# Patient Record
Sex: MALE | Race: WHITE
[De-identification: names, ages, dates, MRNs, and addresses within clinical notes are randomized per-mention and may not be internally consistent; named-entity substitution may affect disease eponyms.]

---

## 2020-04-20 ENCOUNTER — HOSPITAL ENCOUNTER (EMERGENCY)
Dept: HOSPITAL 26 - MED | Age: 30
Discharge: ANOTHER HOSPITAL | End: 2020-04-20
Payer: MEDICAID

## 2020-04-20 VITALS — BODY MASS INDEX: 19.7 KG/M2 | HEIGHT: 68 IN | WEIGHT: 130 LBS

## 2020-04-20 VITALS — DIASTOLIC BLOOD PRESSURE: 67 MMHG | SYSTOLIC BLOOD PRESSURE: 123 MMHG

## 2020-04-20 VITALS — DIASTOLIC BLOOD PRESSURE: 71 MMHG | SYSTOLIC BLOOD PRESSURE: 117 MMHG

## 2020-04-20 DIAGNOSIS — Z02.89: ICD-10-CM

## 2020-04-20 DIAGNOSIS — M54.5: Primary | ICD-10-CM
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
## 2020-04-20 NOTE — NUR
Patient to be transferred to [facility].  Is being transferred due 
to HIGHER LEVEL OF CARE.  Receiving facility has accepting physician and 
available space. ER physician has signed transfer form.  Patient or responsible 
party has agreed to transfer and signed form.  Patient belongings inventoried 
and will be sent with patient.  Copy of nursing notes, lab reports, EKG, 
Physicians Orders and X-rays to be sent with patient.  Report called to JN LEDEZMA 
at receiving facility.

## 2020-05-24 ENCOUNTER — HOSPITAL ENCOUNTER (EMERGENCY)
Dept: HOSPITAL 26 - MED | Age: 30
Discharge: HOME | End: 2020-05-24
Payer: MEDICAID

## 2020-05-24 VITALS — DIASTOLIC BLOOD PRESSURE: 60 MMHG | SYSTOLIC BLOOD PRESSURE: 111 MMHG

## 2020-05-24 VITALS — WEIGHT: 140 LBS | BODY MASS INDEX: 21.22 KG/M2 | HEIGHT: 68 IN

## 2020-05-24 VITALS — SYSTOLIC BLOOD PRESSURE: 109 MMHG | DIASTOLIC BLOOD PRESSURE: 66 MMHG
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
## 2020-05-24 DIAGNOSIS — F17.210: ICD-10-CM
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
## 2020-05-24 DIAGNOSIS — S09.8XXA: ICD-10-CM
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
## 2020-05-24 DIAGNOSIS — F15.10: ICD-10-CM

## 2020-05-24 DIAGNOSIS — S01.01XA: Primary | ICD-10-CM
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
## 2020-05-24 NOTE — NUR
Patient discharged with v/s stable. Written and verbal after care instructions 
given and explained. 

Patient alert, oriented and verbalized understanding of instructions. 
Ambulatory with steady gait. All questions addressed prior to discharge. ID 
band removed. Patient advised to follow up with PMD. Rx of NORCO, IBUPROFEN, 
AND BACITRACIN given. Patient educated on indication of medication including 
possible reaction and side effects. Opportunity to ask questions provided and 
answered.

## 2020-05-31 ENCOUNTER — HOSPITAL ENCOUNTER (EMERGENCY)
Dept: HOSPITAL 26 - MED | Age: 30
Discharge: HOME | End: 2020-05-31
Payer: MEDICAID

## 2020-05-31 VITALS — SYSTOLIC BLOOD PRESSURE: 121 MMHG | DIASTOLIC BLOOD PRESSURE: 80 MMHG

## 2020-05-31 VITALS — BODY MASS INDEX: 21.22 KG/M2 | HEIGHT: 68 IN | WEIGHT: 140 LBS
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
## 2020-05-31 DIAGNOSIS — S01.91XD: Primary | ICD-10-CM

## 2020-05-31 DIAGNOSIS — R00.0: ICD-10-CM

## 2020-05-31 NOTE — NUR
PT AMB TO BED

-------------------------------------------------------------------------------

Addendum: 05/31/20 at 1446 by [email or web address]

-------------------------------------------------------------------------------

STAPLE REMOVAL AT [facility]

## 2023-04-10 NOTE — NUR
WAIT AT LOBBY. Detail Level: Simple Additional Notes: Patient consent was obtained to proceed with the visit and recommended plan of care after discussion of all risks and benefits, including the risks of COVID-19 exposure. Render Risk Assessment In Note?: no